# Patient Record
Sex: FEMALE | Race: AMERICAN INDIAN OR ALASKA NATIVE
[De-identification: names, ages, dates, MRNs, and addresses within clinical notes are randomized per-mention and may not be internally consistent; named-entity substitution may affect disease eponyms.]

---

## 2018-07-15 ENCOUNTER — HOSPITAL ENCOUNTER (EMERGENCY)
Dept: HOSPITAL 42 - ED | Age: 28
Discharge: HOME | End: 2018-07-15
Payer: COMMERCIAL

## 2018-07-15 VITALS — DIASTOLIC BLOOD PRESSURE: 80 MMHG | HEART RATE: 85 BPM | SYSTOLIC BLOOD PRESSURE: 120 MMHG

## 2018-07-15 VITALS — RESPIRATION RATE: 18 BRPM

## 2018-07-15 VITALS — BODY MASS INDEX: 21.4 KG/M2

## 2018-07-15 VITALS — OXYGEN SATURATION: 99 % | TEMPERATURE: 98.1 F

## 2018-07-15 DIAGNOSIS — S16.1XXA: Primary | ICD-10-CM
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
## 2018-07-15 DIAGNOSIS — S46.912A: ICD-10-CM
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
